# Patient Record
(demographics unavailable — no encounter records)

---

## 2024-10-22 NOTE — ASSESSMENT
[FreeTextEntry1] : 66-year-old female with no significant past medical history but at increased risk of significant medical issues.  Patient at increased cardiac risk for cardiovascular issues with negative cardiac workup March 2021 Coronary calcium CAT scan November 2022 = 2 Hyperlipidemia being treated with rosuvastatin Follow-up with cardiology testing as scheduled  Blood pressure high normal with family history of hypertension, patient getting older and cigarette smoking therefore risks of hypertension.  Patient states she does not add salt to her food.  Increase water daily.  Diet and exercise with weight loss.  Patient continues to smoke cigarettes with questionable desire to quit Again discussed importance of quitting and increased risks of cardiovascular disease and other problems that can occur from smoking.  Smoking cessation modes again discussed  persistent hyperkalemia but recently improved will be rechecked with blood work today.  Persistent paresthesias of her feet therefore referral again given to see neurology.  Sporadic edema likely secondary to venous insufficiency with patient wanting opinion therefore given referral to see vascular.  Also told needs much improved diet and exercise with weight loss.Stressed the fact that patient is a prediabetic and she has a family history of diabetes therefore she is at high risk to becoming a diabetic.  Colonoscopy has not been done and also had been given stool fit test which she has not done.  Again stressed the importance of colonoscopy and reluctantly agrees therefore referral given Mammography August 2024 Bone density February 2020 = normal GYN yearly Screening lung CAT scan May 2019.  Follow-up scan November 2022 with coronary calcium CAT scan.  She had declined any further screening CAT scan of the lungs now agrees therefore referral given  Received COVID vaccines Received Shingrix x2 Received Prevnar 20 Influenza vaccine given left deltoid Tdap given September 2023   Venipuncture done today in the office  Follow-up in 6 months

## 2024-10-22 NOTE — HISTORY OF PRESENT ILLNESS
[FreeTextEntry1] : 66-year-old female with no significant past medical history presents for her yearly physical..  Patient continues to smoke cigarettes at one half to one pack per day where she had expressed a desire to quit but hasn't. The patient again states she does want to quit. the patient had taken Chantix and she said she tried it without success.   She had an issue with persistent hyperkalemia therefore sought nephrology 2 times with the patient stating she never heard back from them and the patient never called them. She was given one dose of Lokema December 2021 with decreased potassium and followup potassium a few months later continuing to be normal.  Patient also prediabetic with family history of diabetes without any lifestyle changes including no exercise no change in diet as well as increased weight  Generally feeling well without complaints including no chest pain, palpitations or shortness of breath. She does complain of sporadic edema which she notices mainly with longer car rides and plane rides.  Resolves after a few days.  She has complained of pins and needles the bottom of her feet care home up her foot which has not progressed. She was offered a neurology evaluation but declined.  She was given a referral to neurology last year but did not go but again states she did like to.  Early 2021 the patient complained of palpitations therefore saw cardiology with workup March 2021 with normal carotid duplex, negative stress test and echocardiogram showed normal LVEF with mild MR. No further palpitations. Continues to follow with cardiology last seen May 2024 with stress test and echocardiogram to be done.  Screening CAT scan of the lungs May 2019 which was good with results= LUNG RADS 1. Patient has declined followup CAT scan She did do a coronary calcium CAT scan November 2022 with score = 2 with emphysematous changes with no pulmonary lesions. Hyperlipidemia being treated with rosuvastatin  The patient is  with 3 children and works as a

## 2024-10-22 NOTE — HEALTH RISK ASSESSMENT
[Very Good] : ~his/her~  mood as very good [Yes] : Yes [2 - 4 times a month (2 pts)] : 2-4 times a month (2 points) [1 or 2 (0 pts)] : 1 or 2 (0 points) [Never (0 pts)] : Never (0 points) [No] : In the past 12 months have you used drugs other than those required for medical reasons? No [No falls in past year] : Patient reported no falls in the past year [0] : 2) Feeling down, depressed, or hopeless: Not at all (0) [PHQ-2 Negative - No further assessment needed] : PHQ-2 Negative - No further assessment needed [Current] : Current [20 or more] : 20 or more [1] : 1 [Patient reported mammogram was normal] : Patient reported mammogram was normal [Patient reported PAP Smear was normal] : Patient reported PAP Smear was normal [Patient reported bone density results were normal] : Patient reported bone density results were normal [None] : None [With Significant Other] : lives with significant other [With Family] : lives with family [# of Members in Household ___] :  household currently consist of [unfilled] member(s) [Employed] : employed [] :  [# Of Children ___] : has [unfilled] children [Sexually Active] : sexually active [Feels Safe at Home] : Feels safe at home [Fully functional (bathing, dressing, toileting, transferring, walking, feeding)] : Fully functional (bathing, dressing, toileting, transferring, walking, feeding) [Fully functional (using the telephone, shopping, preparing meals, housekeeping, doing laundry, using] : Fully functional and needs no help or supervision to perform IADLs (using the telephone, shopping, preparing meals, housekeeping, doing laundry, using transportation, managing medications and managing finances) [Reports changes in vision] : Reports changes in vision [Smoke Detector] : smoke detector [Carbon Monoxide Detector] : carbon monoxide detector [Seat Belt] :  uses seat belt [Sunscreen] : uses sunscreen [Reviewed no changes] : Reviewed, no changes [Discussed at today's visit] : Advance Directives Discussed at today's visit [Designated Healthcare Proxy] : Designated healthcare proxy [Name: ___] : Health Care Proxy's Name: [unfilled]  [Good] : ~his/her~  mood as  good [NO] : No [Audit-CScore] : 2 [de-identified] : no exercise [de-identified] : good [ONA6Ekttk] : 0 [LowDoseCTScan] : 11/22 [Change in mental status noted] : No change in mental status noted [Reports changes in hearing] : Reports no changes in hearing [Reports changes in dental health] : Reports no changes in dental health [MammogramDate] : 08/24 [PapSmearDate] : 05/24 [BoneDensityDate] : 02/20 [de-identified] : M-I-L [FreeTextEntry2] :  [de-identified] : glasses [AdvancecareDate] : 10/24

## 2024-10-22 NOTE — PHYSICAL EXAM
[Sclera] : the sclera and conjunctiva were normal [PERRL With Normal Accommodation] : pupils were equal in size, round, and reactive to light [Extraocular Movements] : extraocular movements were intact [Outer Ear] : the ears and nose were normal in appearance [Both Tympanic Membranes Were Examined] : both tympanic membranes were normal [Oropharynx] : the oropharynx was normal [Neck Appearance] : the appearance of the neck was normal [Neck Cervical Mass (___cm)] : no neck mass was observed [Jugular Venous Distention Increased] : there was no jugular-venous distention [Thyroid Diffuse Enlargement] : the thyroid was not enlarged [Thyroid Nodule] : there were no palpable thyroid nodules [Exaggerated Use Of Accessory Muscles For Inspiration] : no accessory muscle use [Auscultation Breath Sounds / Voice Sounds] : lungs were clear to auscultation bilaterally [Heart Rate And Rhythm] : heart rate was normal and rhythm regular [Heart Sounds] : normal S1 and S2 [Heart Sounds Gallop] : no gallops [Murmurs] : no murmurs [Heart Sounds Pericardial Friction Rub] : no pericardial rub [Arterial Pulses Carotid] : carotid pulses were normal with no bruits [Abdominal Aorta] : the abdominal aorta was normal [Arterial Pulses Femoral] : femoral pulses were normal without bruits [Full Pulse] : the pedal pulses are present [Edema] : there was no peripheral edema [Veins - Varicosity Changes] : there were no varicosital changes [Bowel Sounds] : normal bowel sounds [Abdomen Soft] : soft [Abdomen Tenderness] : non-tender [Abdomen Mass (___ Cm)] : no abdominal mass palpated [Cervical Lymph Nodes Enlarged Posterior Bilaterally] : posterior cervical [Cervical Lymph Nodes Enlarged Anterior Bilaterally] : anterior cervical [Supraclavicular Lymph Nodes Enlarged Bilaterally] : supraclavicular [Axillary Lymph Nodes Enlarged Bilaterally] : axillary [Femoral Lymph Nodes Enlarged Bilaterally] : femoral [Inguinal Lymph Nodes Enlarged Bilaterally] : inguinal [No Spinal Tenderness] : no spinal tenderness [Abnormal Walk] : normal gait [Nail Clubbing] : no clubbing  or cyanosis of the fingernails [Musculoskeletal - Swelling] : no joint swelling seen [Motor Tone] : muscle strength and tone were normal [Skin Color & Pigmentation] : normal skin color and pigmentation [Skin Turgor] : normal skin turgor [] : no rash [Cranial Nerves] : cranial nerves 2-12 were intact [No Focal Deficits] : no focal deficits [Oriented To Time, Place, And Person] : oriented to person, place, and time [Impaired Insight] : insight and judgment were intact [Affect] : the affect was normal [FreeTextEntry1] : by gyn [Over the Past 2 Weeks, Have You Felt Down, Depressed, or Hopeless?] : 1.) Over the past 2 weeks, have you felt down, depressed, or hopeless? No [Over the Past 2 Weeks, Have You Felt Little Interest or Pleasure Doing Things?] : 2.) Over the past 2 weeks, have you felt little interest or pleasure doing things? No

## 2024-10-22 NOTE — COUNSELING
[Cessation strategies including cessation program discussed] : Cessation strategies including cessation program discussed [Use of nicotine replacement therapies and other medications discussed] : Use of nicotine replacement therapies and other medications discussed [Encouraged to pick a quit date and identify support needed to quit] : Encouraged to pick a quit date and identify support needed to quit [Yes] : Willing to quit smoking [Potential consequences of obesity discussed] : Potential consequences of obesity discussed [Benefits of weight loss discussed] : Benefits of weight loss discussed [Structured Weight Management Program suggested:] : Structured weight management program suggested [Encouraged to maintain food diary] : Encouraged to maintain food diary [Weight management counseling provided] : Weight management [Healthy eating counseling provided] : healthy eating [Activity counseling provided] : activity [Discussed Risks and Advised to Quit Smoking] : Discussed risks and advised to quit smoking [Discussed Cessation Medication] : cessation medication was discussed [Low Fat Diet] : Low fat diet [Decrease Portions] : Decrease food portions [Quit Smoking] : Quit Smoking [None] : None [Needs reinforcement, provided] : Patient needs reinforcement on understanding lifestyle changes and  the steps needed to achieve self management goals and reinforcement was provided [No] : Not willing to quit smoking [FreeTextEntry1] : 4

## 2024-10-22 NOTE — HEALTH RISK ASSESSMENT
[Very Good] : ~his/her~  mood as very good [Yes] : Yes [2 - 4 times a month (2 pts)] : 2-4 times a month (2 points) [1 or 2 (0 pts)] : 1 or 2 (0 points) [Never (0 pts)] : Never (0 points) [No] : In the past 12 months have you used drugs other than those required for medical reasons? No [No falls in past year] : Patient reported no falls in the past year [0] : 2) Feeling down, depressed, or hopeless: Not at all (0) [PHQ-2 Negative - No further assessment needed] : PHQ-2 Negative - No further assessment needed [Current] : Current [20 or more] : 20 or more [1] : 1 [Patient reported mammogram was normal] : Patient reported mammogram was normal [Patient reported PAP Smear was normal] : Patient reported PAP Smear was normal [Patient reported bone density results were normal] : Patient reported bone density results were normal [None] : None [With Significant Other] : lives with significant other [With Family] : lives with family [# of Members in Household ___] :  household currently consist of [unfilled] member(s) [Employed] : employed [] :  [# Of Children ___] : has [unfilled] children [Sexually Active] : sexually active [Feels Safe at Home] : Feels safe at home [Fully functional (bathing, dressing, toileting, transferring, walking, feeding)] : Fully functional (bathing, dressing, toileting, transferring, walking, feeding) [Fully functional (using the telephone, shopping, preparing meals, housekeeping, doing laundry, using] : Fully functional and needs no help or supervision to perform IADLs (using the telephone, shopping, preparing meals, housekeeping, doing laundry, using transportation, managing medications and managing finances) [Reports changes in vision] : Reports changes in vision [Smoke Detector] : smoke detector [Carbon Monoxide Detector] : carbon monoxide detector [Seat Belt] :  uses seat belt [Sunscreen] : uses sunscreen [Reviewed no changes] : Reviewed, no changes [Discussed at today's visit] : Advance Directives Discussed at today's visit [Designated Healthcare Proxy] : Designated healthcare proxy [Name: ___] : Health Care Proxy's Name: [unfilled]  [Good] : ~his/her~  mood as  good [NO] : No [Audit-CScore] : 2 [de-identified] : no exercise [de-identified] : good [BRE6Byptb] : 0 [LowDoseCTScan] : 11/22 [Change in mental status noted] : No change in mental status noted [Reports changes in hearing] : Reports no changes in hearing [Reports changes in dental health] : Reports no changes in dental health [MammogramDate] : 08/24 [PapSmearDate] : 05/24 [BoneDensityDate] : 02/20 [de-identified] : M-I-L [FreeTextEntry2] :  [de-identified] : glasses [AdvancecareDate] : 10/24

## 2024-10-22 NOTE — HISTORY OF PRESENT ILLNESS
[FreeTextEntry1] : 66-year-old female with no significant past medical history presents for her yearly physical..  Patient continues to smoke cigarettes at one half to one pack per day where she had expressed a desire to quit but hasn't. The patient again states she does want to quit. the patient had taken Chantix and she said she tried it without success.   She had an issue with persistent hyperkalemia therefore sought nephrology 2 times with the patient stating she never heard back from them and the patient never called them. She was given one dose of Lokema December 2021 with decreased potassium and followup potassium a few months later continuing to be normal.  Patient also prediabetic with family history of diabetes without any lifestyle changes including no exercise no change in diet as well as increased weight  Generally feeling well without complaints including no chest pain, palpitations or shortness of breath. She does complain of sporadic edema which she notices mainly with longer car rides and plane rides.  Resolves after a few days.  She has complained of pins and needles the bottom of her feet half-way up her foot which has not progressed. She was offered a neurology evaluation but declined.  She was given a referral to neurology last year but did not go but again states she did like to.  Early 2021 the patient complained of palpitations therefore saw cardiology with workup March 2021 with normal carotid duplex, negative stress test and echocardiogram showed normal LVEF with mild MR. No further palpitations. Continues to follow with cardiology last seen May 2024 with stress test and echocardiogram to be done.  Screening CAT scan of the lungs May 2019 which was good with results= LUNG RADS 1. Patient has declined followup CAT scan She did do a coronary calcium CAT scan November 2022 with score = 2 with emphysematous changes with no pulmonary lesions. Hyperlipidemia being treated with rosuvastatin  The patient is  with 3 children and works as a

## 2024-12-04 NOTE — ASSESSMENT
[FreeTextEntry1] : Urine culture sent out.Patient prescribed Augmentin X 10 days  .Patient advised to rest/increase fluids and use supportive therapy. Patient will call if symptoms persist or worsen and return to the office as scheduled for regular followup.   Dr. Ruvalcaba was present in office building while I examined patient

## 2024-12-04 NOTE — PHYSICAL EXAM
[No Acute Distress] : no acute distress [No Respiratory Distress] : no respiratory distress  [Clear to Auscultation] : lungs were clear to auscultation bilaterally [Normal Rate] : normal rate  [Regular Rhythm] : with a regular rhythm [Normal Affect] : the affect was normal [Normal Mood] : the mood was normal [Normal Outer Ear/Nose] : the outer ears and nose were normal in appearance [Normal TMs] : both tympanic membranes were normal [Supple] : supple [No CVA Tenderness] : no CVA  tenderness [de-identified] : +narendra pinon, +PND [de-identified] : + Maxillary and frontal pressure

## 2024-12-04 NOTE — HISTORY OF PRESENT ILLNESS
[FreeTextEntry8] : Patient presents complaining of -Increased urinary frequency with urinary odor for 1 week, no dysuria/hematuria.  Patient has history of UTI and states this is how it starts -Also complaining of cough/congestion with sinus pressure for 1 week, no fever/COVID exposure but did not take a COVID test.  Patient states phlegm is discolored -Patient reports recent A-fib, following with cardiology and on Toprol/Eliquis

## 2025-02-10 NOTE — HISTORY OF PRESENT ILLNESS
[FreeTextEntry8] : Patient presents complaining of abnormal urine odor for at least 1 week.  Patient did have a mild urgency.  Patient denies dysuria/hematuria.  Patient is prone to UTI and this is generally how it starts.

## 2025-02-10 NOTE — ASSESSMENT
[FreeTextEntry1] : Urine culture sent out.  Bactrim x 7 days given.  Patient will call if symptoms continue or worsen and return to the office as scheduled for regular follow-up   Dr. Ruvalcaba was present in office building while I examined patient

## 2025-04-11 NOTE — ASSESSMENT
[FreeTextEntry1] : Urine sent out.  Urogynecology referral given.  Venipuncture done in our office, Labs sent out /further recommendations will be made based on the results. Patient will continue with diet/exercise and specialist followup. Patient will return to the office in oct for CP  Attending MD available by phone if needed  screening CT chest=did CT CA score=11/2022= "to do"= referral again given CT CA score=2=11/2022 Mammogram was done = to call for report Bone density was 2/2020=normal "to do"= referral given  still has not scheduled a colonoscopy but states she will do soon "to do"= referral given/FIT "to do" Quit smoking as of 11/2024 specialists include 1. GYN- office ?MD  name 2.Cardio-Dr. [place] 3.Renal-Dr. Melara for hyperkalemia 4.Neuro "may do" +got covid vaccines  carotid sono 3/2021=normal echo via cardio.

## 2025-04-11 NOTE — HISTORY OF PRESENT ILLNESS
[FreeTextEntry1] : Patient presents for followup on hyperlipidemia/prediabetes/obesity. Patient is currently NOT fasting. Patient is trying to improve her sugar dietarily, on Crestor for hyperlipidemia and is trying to work on her obesity. -Patient quit smoking as of 11/2024 -Patient with recurrent UTIs, would like to leave urine sample, on cranberry supplement

## 2025-05-30 NOTE — HISTORY OF PRESENT ILLNESS
[FreeTextEntry1] : Patient presents for followup  Previous blood work showed - TSH elevated at 13.7, newly on Synthroid - Potassium of 5.6, ongoing/recurrent, newly on Kayexalate as Lokelma was not covered (saw renal 2021, no recs or issue at that time) -on Abx for UTI per clinic, would like to drop off urine at lab 1 week post completion of abx

## 2025-05-30 NOTE — ADDENDUM
[FreeTextEntry1] : Labs show - TSH elevated, increase Synthroid to 50 mcg daily - Potassium is normal but patient having some nausea doing Rx every other day therefore changed to 2-3 times a week

## 2025-05-30 NOTE — ASSESSMENT
[FreeTextEntry1] : Order in to repeat urine post abx.Venipuncture done in our office, Labs sent out /further recommendations will be made based on the results. Patient will continue with diet/exercise and specialist followup. Patient will return to the office in oct for CP as sched   screening CT chest=did CT CA score=11/2022= "to do" CT CA score=2=11/2022 Mammogram was done =8/2024 Bone density was 2/2020=normal "to do"  still has not scheduled a colonoscopy but states she will do soon "to do"=/FIT "to do" Quit smoking as of 11/2024 specialists include 1. GYN- office ?MD  name 2.Cardio-Dr. [place] 3.Renal-Dr. Melara for [id number] 4.Neuro "may do" 5.Urogyno=has apt sched for aug +got covid vaccines  carotid sono 3/2021=normal echo via cardio.

## 2025-05-30 NOTE — ASSESSMENT
[FreeTextEntry1] : Order in to repeat urine post abx.Venipuncture done in our office, Labs sent out /further recommendations will be made based on the results. Patient will continue with diet/exercise and specialist followup. Patient will return to the office in oct for CP as sched   screening CT chest=did CT CA score=11/2022= "to do" CT CA score=2=11/2022 Mammogram was done =8/2024 Bone density was 2/2020=normal "to do"  still has not scheduled a colonoscopy but states she will do soon "to do"=/FIT "to do" Quit smoking as of 11/2024 specialists include 1. GYN- office ?MD  name 2.Cardio-Dr. [place] 3.Renal-Dr. Melara for [id number] 4.Neuro "may do" 5.Urogyno=has apt sched for aug +got covid vaccines  carotid sono 3/2021=normal echo via cardio.

## 2025-05-30 NOTE — ASSESSMENT
[FreeTextEntry1] : Order in to repeat urine post abx.Venipuncture done in our office, Labs sent out /further recommendations will be made based on the results. Patient will continue with diet/exercise and specialist followup. Patient will return to the office in oct for CP as sched   screening CT chest=did CT CA score=11/2022= "to do" CT CA score=2=11/2022 Mammogram was done =8/2024 Bone density was 2/2020=normal "to do"  still has not scheduled a colonoscopy but states she will do soon "to do"=/FIT "to do" Quit smoking as of 11/2024 specialists include 1. GYN- office ?MD  name 2.Cardio-Dr. [place] 3.Renal-Dr. Melara for [id number] 4.Neuro "may do" 5.Urogyno=has apt sched for aug +got covid vaccines  carotid sono 3/2021=normal echo via cardio.